# Patient Record
Sex: FEMALE | NOT HISPANIC OR LATINO
[De-identification: names, ages, dates, MRNs, and addresses within clinical notes are randomized per-mention and may not be internally consistent; named-entity substitution may affect disease eponyms.]

---

## 2018-03-20 ENCOUNTER — RESULT REVIEW (OUTPATIENT)
Age: 53
End: 2018-03-20

## 2018-08-15 ENCOUNTER — APPOINTMENT (OUTPATIENT)
Dept: CARDIOLOGY | Facility: CLINIC | Age: 53
End: 2018-08-15

## 2019-03-25 ENCOUNTER — MEDICATION RENEWAL (OUTPATIENT)
Age: 54
End: 2019-03-25

## 2019-03-25 ENCOUNTER — APPOINTMENT (OUTPATIENT)
Dept: CARDIOLOGY | Facility: CLINIC | Age: 54
End: 2019-03-25
Payer: COMMERCIAL

## 2019-03-25 ENCOUNTER — NON-APPOINTMENT (OUTPATIENT)
Age: 54
End: 2019-03-25

## 2019-03-25 VITALS — HEIGHT: 62 IN | BODY MASS INDEX: 29.08 KG/M2 | WEIGHT: 158 LBS

## 2019-03-25 VITALS — SYSTOLIC BLOOD PRESSURE: 124 MMHG | DIASTOLIC BLOOD PRESSURE: 70 MMHG

## 2019-03-25 VITALS — HEART RATE: 62 BPM | OXYGEN SATURATION: 96 %

## 2019-03-25 DIAGNOSIS — R73.03 PREDIABETES.: ICD-10-CM

## 2019-03-25 DIAGNOSIS — K64.9 UNSPECIFIED HEMORRHOIDS: ICD-10-CM

## 2019-03-25 DIAGNOSIS — Z78.0 ASYMPTOMATIC MENOPAUSAL STATE: ICD-10-CM

## 2019-03-25 DIAGNOSIS — Z00.00 ENCOUNTER FOR GENERAL ADULT MEDICAL EXAMINATION W/OUT ABNORMAL FINDINGS: ICD-10-CM

## 2019-03-25 DIAGNOSIS — Z82.0 FAMILY HISTORY OF EPILEPSY AND OTHER DISEASES OF THE NERVOUS SYSTEM: ICD-10-CM

## 2019-03-25 DIAGNOSIS — M54.5 LOW BACK PAIN: ICD-10-CM

## 2019-03-25 DIAGNOSIS — Z82.49 FAMILY HISTORY OF ISCHEMIC HEART DISEASE AND OTHER DISEASES OF THE CIRCULATORY SYSTEM: ICD-10-CM

## 2019-03-25 DIAGNOSIS — R00.1 BRADYCARDIA, UNSPECIFIED: ICD-10-CM

## 2019-03-25 DIAGNOSIS — R00.2 PALPITATIONS: ICD-10-CM

## 2019-03-25 DIAGNOSIS — R07.89 OTHER CHEST PAIN: ICD-10-CM

## 2019-03-25 DIAGNOSIS — M43.10 SPONDYLOLISTHESIS, SITE UNSPECIFIED: ICD-10-CM

## 2019-03-25 PROCEDURE — 93000 ELECTROCARDIOGRAM COMPLETE: CPT

## 2019-03-25 PROCEDURE — 99204 OFFICE O/P NEW MOD 45 MIN: CPT

## 2019-03-25 RX ORDER — OMEPRAZOLE 40 MG/1
40 CAPSULE, DELAYED RELEASE ORAL DAILY
Refills: 0 | Status: ACTIVE | COMMUNITY
Start: 2019-03-25

## 2019-03-25 NOTE — PHYSICAL EXAM
[General Appearance - Well Developed] : well developed [Normal Appearance] : normal appearance [Well Groomed] : well groomed [General Appearance - Well Nourished] : well nourished [No Deformities] : no deformities [General Appearance - In No Acute Distress] : no acute distress [Normal Conjunctiva] : the conjunctiva exhibited no abnormalities [Eyelids - No Xanthelasma] : the eyelids demonstrated no xanthelasmas [Normal Jugular Venous A Waves Present] : normal jugular venous A waves present [Normal Jugular Venous V Waves Present] : normal jugular venous V waves present [No Jugular Venous Miramontes A Waves] : no jugular venous miramontes A waves [Respiration, Rhythm And Depth] : normal respiratory rhythm and effort [Exaggerated Use Of Accessory Muscles For Inspiration] : no accessory muscle use [Auscultation Breath Sounds / Voice Sounds] : lungs were clear to auscultation bilaterally [Heart Rate And Rhythm] : heart rate and rhythm were normal [Heart Sounds] : normal S1 and S2 [Murmurs] : no murmurs present [Abdomen Soft] : soft [Abdomen Tenderness] : non-tender [Abdomen Mass (___ Cm)] : no abdominal mass palpated [Abnormal Walk] : normal gait [Gait - Sufficient For Exercise Testing] : the gait was sufficient for exercise testing [Nail Clubbing] : no clubbing of the fingernails [Cyanosis, Localized] : no localized cyanosis [Petechial Hemorrhages (___cm)] : no petechial hemorrhages [Skin Color & Pigmentation] : normal skin color and pigmentation [] : no rash [No Venous Stasis] : no venous stasis [Skin Lesions] : no skin lesions [No Skin Ulcers] : no skin ulcer [No Xanthoma] : no  xanthoma was observed [Oriented To Time, Place, And Person] : oriented to person, place, and time [Affect] : the affect was normal [Mood] : the mood was normal [No Anxiety] : not feeling anxious [Normal Oral Mucosa] : normal oral mucosa [No Oral Pallor] : no oral pallor [No Oral Cyanosis] : no oral cyanosis

## 2019-03-27 PROBLEM — R00.1 SINUS BRADYCARDIA: Status: ACTIVE | Noted: 2019-03-27

## 2019-03-27 NOTE — DISCUSSION/SUMMARY
[FreeTextEntry1] : For further evaluation of chest tightness and more for her reassurance, I ordered a simple treadmill exercise stress test.  Palpitation is very insignificant  and without any hemodynamic symptoms.  For further evaluation of the above complaints I also ordered an echocardiogram to confirm that the left ventricular size and function are normal.\par \par  At this time she does not need any Holter monitor.  If in future the palpitation gets more severe or she starts having episodes of tachycardia or hemodynamic symptoms, then appropriate monitoring would be recommended.

## 2019-03-27 NOTE — ASSESSMENT
[FreeTextEntry1] : Her physical exam is normal.  Chest pressure is atypical. It is unlikely to be cardiac.  Palpitation is infrequent and most likely due to isolated skipping of heartbeat.  There are no associated hemodynamic symptoms or complications.\par \par I obtained copies of blood tests from August 2018.  Comprehensive metabolic panel and magnesium level were normal.  Total cholesterol was 156 with HDL 61 and LDL 85.  Triglycerides were 49.  TSH was 1.3 and hemoglobin A1c was 5.6.\par \par EKG showed sinus bradycardia with right atrial abnormality. Her sinus bradycardia is probably due to exercising regularly.  As noted above lipid profile is excellent.

## 2019-03-27 NOTE — HISTORY OF PRESENT ILLNESS
[FreeTextEntry1] : 53-year-old lady was referred by Dr. Lui for cardiac evaluation because of abnormal EKG.\par \par In November of last year during a routine physical an EKG was done.  There was some abnormality.  Dr. Lui asked her if she works out a lot.   She does not remember the details. She does go to gym regularly and works out. She does cardiovascular exercises with elliptical  machine and other similar machines, She also takes a number of classes and does some weight training.  She has no symptoms while doing above.\par \par She sometimes gets chest tightness, which his on and off.  A single episode may last up to 30 minutes.  Recently she had it for a total duration of 1 1/2 hours. It is unrelated to physical exertion.  Taking a deep breath actually makes her feel better.  There is no chest wall tenderness.  There is no associated shortness of breath or dizziness.  Sometimes she also gets palpitations which she describes as heartbeat "jumps".  There is no racing.  She wonders whether this is related to menopause.She is getting hot flashes.\par \par She was told to have borderline diabetes.She was treated with metformin for 4-6 months.  Repeat A1c was normal so metformin apparently was discontinued in November.  Hb A1C has not been repeated since.

## 2019-04-17 ENCOUNTER — APPOINTMENT (OUTPATIENT)
Dept: CARDIOLOGY | Facility: CLINIC | Age: 54
End: 2019-04-17

## 2019-04-23 ENCOUNTER — APPOINTMENT (OUTPATIENT)
Dept: CARDIOLOGY | Facility: CLINIC | Age: 54
End: 2019-04-23